# Patient Record
Sex: FEMALE | Race: WHITE | NOT HISPANIC OR LATINO | Employment: OTHER | URBAN - METROPOLITAN AREA
[De-identification: names, ages, dates, MRNs, and addresses within clinical notes are randomized per-mention and may not be internally consistent; named-entity substitution may affect disease eponyms.]

---

## 2021-08-19 ENCOUNTER — HOSPITAL ENCOUNTER (EMERGENCY)
Facility: CLINIC | Age: 66
Discharge: HOME | End: 2021-08-19
Attending: EMERGENCY MEDICINE | Admitting: EMERGENCY MEDICINE
Payer: COMMERCIAL

## 2021-08-19 VITALS
SYSTOLIC BLOOD PRESSURE: 168 MMHG | HEART RATE: 61 BPM | TEMPERATURE: 97.8 F | RESPIRATION RATE: 20 BRPM | OXYGEN SATURATION: 98 % | DIASTOLIC BLOOD PRESSURE: 77 MMHG

## 2021-08-19 DIAGNOSIS — I10 HYPERTENSION, UNSPECIFIED TYPE: ICD-10-CM

## 2021-08-19 LAB
ANION GAP SERPL CALCULATED.3IONS-SCNC: 1 MMOL/L (ref 3–14)
ATRIAL RATE - MUSE: 62 BPM
BASOPHILS # BLD AUTO: 0 10E3/UL (ref 0–0.2)
BASOPHILS NFR BLD AUTO: 1 %
BUN SERPL-MCNC: 11 MG/DL (ref 7–30)
CALCIUM SERPL-MCNC: 8.9 MG/DL (ref 8.5–10.1)
CHLORIDE BLD-SCNC: 109 MMOL/L (ref 94–109)
CO2 SERPL-SCNC: 30 MMOL/L (ref 20–32)
CREAT SERPL-MCNC: 0.71 MG/DL (ref 0.52–1.04)
DIASTOLIC BLOOD PRESSURE - MUSE: NORMAL MMHG
EOSINOPHIL # BLD AUTO: 0.2 10E3/UL (ref 0–0.7)
EOSINOPHIL NFR BLD AUTO: 4 %
ERYTHROCYTE [DISTWIDTH] IN BLOOD BY AUTOMATED COUNT: 12 % (ref 10–15)
GFR SERPL CREATININE-BSD FRML MDRD: 88 ML/MIN/1.73M2
GLUCOSE BLD-MCNC: 100 MG/DL (ref 70–99)
HCT VFR BLD AUTO: 41.8 % (ref 35–47)
HGB BLD-MCNC: 13.8 G/DL (ref 11.7–15.7)
IMM GRANULOCYTES # BLD: 0 10E3/UL
IMM GRANULOCYTES NFR BLD: 0 %
INTERPRETATION ECG - MUSE: NORMAL
LYMPHOCYTES # BLD AUTO: 2 10E3/UL (ref 0.8–5.3)
LYMPHOCYTES NFR BLD AUTO: 35 %
MCH RBC QN AUTO: 30.4 PG (ref 26.5–33)
MCHC RBC AUTO-ENTMCNC: 33 G/DL (ref 31.5–36.5)
MCV RBC AUTO: 92 FL (ref 78–100)
MONOCYTES # BLD AUTO: 0.4 10E3/UL (ref 0–1.3)
MONOCYTES NFR BLD AUTO: 8 %
NEUTROPHILS # BLD AUTO: 3 10E3/UL (ref 1.6–8.3)
NEUTROPHILS NFR BLD AUTO: 52 %
NRBC # BLD AUTO: 0 10E3/UL
NRBC BLD AUTO-RTO: 0 /100
P AXIS - MUSE: 67 DEGREES
PLATELET # BLD AUTO: 254 10E3/UL (ref 150–450)
POTASSIUM BLD-SCNC: 3.9 MMOL/L (ref 3.4–5.3)
PR INTERVAL - MUSE: 160 MS
QRS DURATION - MUSE: 68 MS
QT - MUSE: 432 MS
QTC - MUSE: 438 MS
R AXIS - MUSE: 20 DEGREES
RBC # BLD AUTO: 4.54 10E6/UL (ref 3.8–5.2)
SODIUM SERPL-SCNC: 140 MMOL/L (ref 133–144)
SYSTOLIC BLOOD PRESSURE - MUSE: NORMAL MMHG
T AXIS - MUSE: 3 DEGREES
TROPONIN I SERPL-MCNC: <0.015 UG/L (ref 0–0.04)
VENTRICULAR RATE- MUSE: 62 BPM
WBC # BLD AUTO: 5.8 10E3/UL (ref 4–11)

## 2021-08-19 PROCEDURE — 84484 ASSAY OF TROPONIN QUANT: CPT | Performed by: EMERGENCY MEDICINE

## 2021-08-19 PROCEDURE — 85041 AUTOMATED RBC COUNT: CPT | Performed by: EMERGENCY MEDICINE

## 2021-08-19 PROCEDURE — 80048 BASIC METABOLIC PNL TOTAL CA: CPT | Performed by: EMERGENCY MEDICINE

## 2021-08-19 PROCEDURE — 36415 COLL VENOUS BLD VENIPUNCTURE: CPT | Performed by: EMERGENCY MEDICINE

## 2021-08-19 PROCEDURE — 93005 ELECTROCARDIOGRAM TRACING: CPT

## 2021-08-19 PROCEDURE — 99284 EMERGENCY DEPT VISIT MOD MDM: CPT

## 2021-08-19 ASSESSMENT — ENCOUNTER SYMPTOMS
SHORTNESS OF BREATH: 0
NERVOUS/ANXIOUS: 1
HEADACHES: 0

## 2021-08-19 NOTE — ED PROVIDER NOTES
History   Chief Complaint:  Hypertension       HPI   Karen Ruvalcaba is a 66 year old female who presents with hypertension. Patient was unable to sleep tonight and is unsure why. She says she felt anxious so she checked her blood pressure and it was high around 210/100. She has no history of hypertension and is not on medication for it. She denies any chest pain, shortness of breath, or headache.        Review of Systems   Respiratory: Negative for shortness of breath.    Cardiovascular: Negative for chest pain.   Neurological: Negative for headaches.   Psychiatric/Behavioral: The patient is nervous/anxious.    All other systems reviewed and are negative.        Allergies:  Codeine  Compazine    Medications:  Denies current medication use    Past Medical History:    Patient denies past medical history including hypertension     Past Surgical History:    The patient denies past surgical history.      Social History:  Patient presents to the ED alone.  Patient lives alone.    Physical Exam     Patient Vitals for the past 24 hrs:   BP Temp Temp src Pulse Resp SpO2   08/19/21 0532 (!) 168/77 -- -- 61 -- 98 %   08/19/21 0527 -- -- -- -- -- 99 %   08/19/21 0522 (!) 178/70 -- -- -- -- 98 %   08/19/21 0456 (!) 184/84 -- -- -- -- --   08/19/21 0400 (!) 199/91 97.8  F (36.6  C) Temporal 62 20 99 %       Physical Exam  Constitutional:  Oriented to person, place, and time.  HENT:   Head:    Normocephalic.   Mouth/Throat:   Oropharynx is clear and moist.   Eyes:    EOM are normal. Pupils are equal, round, and reactive to light.   Neck:    Neck supple.   Cardiovascular:  Normal rate, regular rhythm and normal heart sounds.      Exam reveals no gallop and no friction rub.       No murmur heard.  Pulmonary/Chest:  Effort normal and breath sounds normal.      No respiratory distress. No wheezes. No rales.      No reproducible chest wall pain.  Abdominal:   Soft. No distension. No tenderness. No rebound and no guarding.    Musculoskeletal:  Normal range of motion.   Neurological:   Alert and oriented to person, place, and time.           Moves all 4 extremities spontaneously    Skin:    No rash noted. No pallor.       Emergency Department Course     ECG:  ECG taken at 0429, ECG read at 0452  Normal sinus rhythm.  Rate 62 bpm. GA interval 160 ms. QRS duration 68 ms. QT/QTc 432/438 ms. P-R-T axes 67 20 3.     Laboratory:  CBC: WBC: 5.8, HGB: 13.8, PLT: 254  BMP: Glucose 100 (H), Anion Gap: 1 (low), o/w WNL (Creatinine: 0.71)  Troponin (Collected 0441): <0.015    Emergency Department Course:    Reviewed:  nursing notes, vitals, past medical history and care everywhere    Assessments:    0455 Initial assessment     0525 I rechecked the patient and discussed the results of their workup thus far.     Disposition:  The patient was discharged to home.       Impression & Plan     Medical Decision Making:  Karen Ruvalcaba is a 66 year old female who presents for evaluation of elevated blood pressure.  There is no history of hypertension in the past.  The workup here is negative and the patient does not have any clinical, laboratory, ecg or historical signs of end-organ dysfunction.  There is no signs of hypertensive emergency or urgency.  Supportive outpatient management is therefore indicated with close follow-up of primary care physician.  Given data obtained here in ED, will not initiate for therapy at this time and encouraged serial blood pressure monitoring at home to aid primary in decision making regarding hypertension.       Diagnosis:    ICD-10-CM    1. Hypertension, unspecified type  I10        Discharge Medications:  There are no discharge medications for this patient.      Scribe Disclosure:  I, Elias Bolaños, am serving as a scribe at 4:51 AM on 8/19/2021 to document services personally performed by Jl Carmona MD based on my observations and the provider's statements to me.              Jl Carmona MD  08/19/21 9061